# Patient Record
Sex: MALE | Race: WHITE | URBAN - METROPOLITAN AREA
[De-identification: names, ages, dates, MRNs, and addresses within clinical notes are randomized per-mention and may not be internally consistent; named-entity substitution may affect disease eponyms.]

---

## 2019-08-07 ENCOUNTER — EMERGENCY (EMERGENCY)
Facility: HOSPITAL | Age: 59
LOS: 1 days | Discharge: HOME | End: 2019-08-07
Attending: EMERGENCY MEDICINE | Admitting: EMERGENCY MEDICINE
Payer: MEDICAID

## 2019-08-07 VITALS
OXYGEN SATURATION: 95 % | DIASTOLIC BLOOD PRESSURE: 70 MMHG | TEMPERATURE: 98 F | HEART RATE: 59 BPM | RESPIRATION RATE: 17 BRPM | WEIGHT: 145.06 LBS | SYSTOLIC BLOOD PRESSURE: 150 MMHG

## 2019-08-07 PROCEDURE — 93010 ELECTROCARDIOGRAM REPORT: CPT

## 2019-08-07 PROCEDURE — 99284 EMERGENCY DEPT VISIT MOD MDM: CPT

## 2019-08-07 RX ORDER — ESOMEPRAZOLE MAGNESIUM 40 MG/1
1 CAPSULE, DELAYED RELEASE ORAL
Qty: 30 | Refills: 0
Start: 2019-08-07 | End: 2019-09-05

## 2019-08-07 RX ORDER — FAMOTIDINE 10 MG/ML
20 INJECTION INTRAVENOUS ONCE
Refills: 0 | Status: COMPLETED | OUTPATIENT
Start: 2019-08-07 | End: 2019-08-07

## 2019-08-07 RX ADMIN — FAMOTIDINE 20 MILLIGRAM(S): 10 INJECTION INTRAVENOUS at 18:56

## 2019-08-07 RX ADMIN — Medication 30 MILLILITER(S): at 18:56

## 2019-08-07 NOTE — ED PROVIDER NOTE - CLINICAL SUMMARY MEDICAL DECISION MAKING FREE TEXT BOX
59 y.o M presents to the ED with c.o upper abdominal pain described as a burning sensation. EKG showed sinus bradycardic with a rate of 52bpm. Will give Pepcid and Maalox in ED and rx Nexium. Likely discharge. 59 y.o M presents to the ED with c.o upper abdominal pain described as a burning sensation. EKG showed sinus bradycardic with a rate of 52bpm. Will give Pepcid and Maalox in ED and rx Nexium. Likely discharge.    Improved after GI cocktail, tolerated PO, stable for dc home

## 2019-08-07 NOTE — ED PROVIDER NOTE - CARE PROVIDER_API CALL
Mele Rogel (DO)  Medicine  Dept Director  [address] [place] [address], [place] [address]  Phone: [phone]  Fax: [phone]  Follow Up Time:

## 2019-08-07 NOTE — ED ADULT NURSE NOTE - CHPI ED NUR SYMPTOMS NEG
no cough/no body aches/no headache/no chest pain/no hemoptysis/no chills/no wheezing/no diaphoresis/no edema/no fever

## 2019-08-07 NOTE — ED ADULT TRIAGE NOTE - CHIEF COMPLAINT QUOTE
[facility] c/o unresolved sob [id number] worsening after smoking crack. admits to illicit drug use 2hrs pta, also adds midsternal nonradiating chest pressure. hx schizo/etoh use/peptic ulcer. currently no resp distress. speaking in full sentences, calm and compliant.

## 2019-08-07 NOTE — ED ADULT NURSE NOTE - OBJECTIVE STATEMENT
Pt presents to ED with c/o shortness of breath after smoking crack cocaine- he reports his SRO is "stuffy and has too much radiation," and that there is "acid in my food." He states he needs respite for a few days and decided to come to  the emergency department.

## 2019-08-07 NOTE — ED PROVIDER NOTE - OBJECTIVE STATEMENT
59 y.o M with PMHx schizophrenia, EtOH dependence, and peptic ulcer presents [facility] the [facility] with c/o upper abdominal pain described as "burning" sensation. Pt notes having cough with productive sputum described as a light yellow color. He reports it may be related to acid reflux since he has an acidic taste in his mouth. Admits to drinking alcohol and doing illicit drugs today which exacerbated sx. Pt is currently on Nexium but denies taking medication PTA. Notes having previous episodes of vomiting in the past but otherwise denies fever, chills, N/V, difficulty breathing, hemoptysis, headache.

## 2019-08-07 NOTE — ED ADULT NURSE NOTE - CHIEF COMPLAINT QUOTE
[facility] c/o unresolved sob [id number] worsening after smoking crack. admits to illicit drug use 2hrs pta, also adds midsternal nonradiating chest pressure. hx schizo/etoh use/peptic ulcer. currently no resp distress. speaking in full sentences, calm and compliant.

## 2019-08-11 DIAGNOSIS — R06.02 SHORTNESS OF BREATH: ICD-10-CM

## 2019-08-11 DIAGNOSIS — R05 COUGH: ICD-10-CM

## 2019-08-11 DIAGNOSIS — R10.13 EPIGASTRIC PAIN: ICD-10-CM
